# Patient Record
Sex: MALE
[De-identification: names, ages, dates, MRNs, and addresses within clinical notes are randomized per-mention and may not be internally consistent; named-entity substitution may affect disease eponyms.]

---

## 2017-01-16 ENCOUNTER — CHARTING TRANS (OUTPATIENT)
Dept: OTHER | Age: 67
End: 2017-01-16

## 2017-01-21 ENCOUNTER — MYAURORA ACCOUNT LINK (OUTPATIENT)
Dept: OTHER | Age: 67
End: 2017-01-21

## 2017-01-21 ENCOUNTER — IMAGING SERVICES (OUTPATIENT)
Dept: OTHER | Age: 67
End: 2017-01-21

## 2017-01-21 ENCOUNTER — CHARTING TRANS (OUTPATIENT)
Dept: INTERNAL MEDICINE | Age: 67
End: 2017-01-21

## 2017-01-25 ENCOUNTER — CHARTING TRANS (OUTPATIENT)
Dept: OTHER | Age: 67
End: 2017-01-25

## 2017-02-03 ENCOUNTER — CHARTING TRANS (OUTPATIENT)
Dept: SPORTS MEDICINE | Age: 67
End: 2017-02-03

## 2017-02-03 ENCOUNTER — MYAURORA ACCOUNT LINK (OUTPATIENT)
Dept: OTHER | Age: 67
End: 2017-02-03

## 2017-02-06 ENCOUNTER — CHARTING TRANS (OUTPATIENT)
Dept: OTHER | Age: 67
End: 2017-02-06

## 2017-03-17 ENCOUNTER — CHARTING TRANS (OUTPATIENT)
Dept: PODIATRY | Age: 67
End: 2017-03-17

## 2017-03-17 ENCOUNTER — MYAURORA ACCOUNT LINK (OUTPATIENT)
Dept: OTHER | Age: 67
End: 2017-03-17

## 2017-06-21 ENCOUNTER — CHARTING TRANS (OUTPATIENT)
Dept: OTHER | Age: 67
End: 2017-06-21

## 2017-06-23 ENCOUNTER — CHARTING TRANS (OUTPATIENT)
Dept: OTHER | Age: 67
End: 2017-06-23

## 2018-11-29 VITALS
DIASTOLIC BLOOD PRESSURE: 68 MMHG | HEIGHT: 71 IN | SYSTOLIC BLOOD PRESSURE: 120 MMHG | BODY MASS INDEX: 30.1 KG/M2 | WEIGHT: 215 LBS | HEART RATE: 86 BPM

## 2018-11-29 VITALS
DIASTOLIC BLOOD PRESSURE: 68 MMHG | WEIGHT: 215 LBS | BODY MASS INDEX: 30.1 KG/M2 | HEART RATE: 82 BPM | HEIGHT: 71 IN | SYSTOLIC BLOOD PRESSURE: 122 MMHG

## 2018-11-29 VITALS
HEART RATE: 85 BPM | OXYGEN SATURATION: 96 % | DIASTOLIC BLOOD PRESSURE: 62 MMHG | WEIGHT: 213 LBS | SYSTOLIC BLOOD PRESSURE: 106 MMHG

## 2019-11-20 ENCOUNTER — NEW PATIENT COMPREHENSIVE (OUTPATIENT)
Dept: URBAN - METROPOLITAN AREA CLINIC 36 | Facility: CLINIC | Age: 69
End: 2019-11-20

## 2019-11-20 DIAGNOSIS — H25.812: ICD-10-CM

## 2019-11-20 DIAGNOSIS — H25.811: ICD-10-CM

## 2019-11-20 DIAGNOSIS — H52.7: ICD-10-CM

## 2019-11-20 PROCEDURE — 92015 DETERMINE REFRACTIVE STATE: CPT

## 2019-11-20 PROCEDURE — 92004 COMPRE OPH EXAM NEW PT 1/>: CPT

## 2019-11-20 ASSESSMENT — VISUAL ACUITY
OD_CC: 20/20
OS_SC: J8
OS_CC: J1
OS_SC: 20/30+2
OS_CC: 20/20
OD_CC: J1
OD_SC: 20/30
OD_SC: J10

## 2019-11-20 ASSESSMENT — TONOMETRY
OD_IOP_MMHG: 16
OS_IOP_MMHG: 15

## 2021-01-04 ENCOUNTER — RX ONLY (OUTPATIENT)
Age: 71
Setting detail: RX ONLY
End: 2021-01-04

## 2021-08-05 ENCOUNTER — NEW PATIENT COMPREHENSIVE (OUTPATIENT)
Dept: URBAN - METROPOLITAN AREA CLINIC 36 | Facility: CLINIC | Age: 71
End: 2021-08-05

## 2021-08-05 DIAGNOSIS — H25.812: ICD-10-CM

## 2021-08-05 DIAGNOSIS — H43.813: ICD-10-CM

## 2021-08-05 DIAGNOSIS — H35.371: ICD-10-CM

## 2021-08-05 DIAGNOSIS — H17.9: ICD-10-CM

## 2021-08-05 DIAGNOSIS — H52.7: ICD-10-CM

## 2021-08-05 DIAGNOSIS — H25.811: ICD-10-CM

## 2021-08-05 DIAGNOSIS — H53.2: ICD-10-CM

## 2021-08-05 DIAGNOSIS — H35.363: ICD-10-CM

## 2021-08-05 PROCEDURE — 92004 COMPRE OPH EXAM NEW PT 1/>: CPT

## 2021-08-05 PROCEDURE — 92015 DETERMINE REFRACTIVE STATE: CPT

## 2021-08-05 ASSESSMENT — VISUAL ACUITY
OS_CC: 20/30+2
OD_SC: J12
OD_CC: 20/20
OD_SC: 20/20-1
OD_CC: J2
OS_CC: J4
OS_PH: 20/20
OS_SC: 20/40
OS_SC: J12

## 2021-08-05 ASSESSMENT — TONOMETRY
OD_IOP_MMHG: 15
OS_IOP_MMHG: 15

## 2021-08-17 ENCOUNTER — CATARACT CONSULT (OUTPATIENT)
Dept: URBAN - METROPOLITAN AREA CLINIC 43 | Facility: CLINIC | Age: 71
End: 2021-08-17

## 2021-08-17 DIAGNOSIS — H35.363: ICD-10-CM

## 2021-08-17 DIAGNOSIS — H17.9: ICD-10-CM

## 2021-08-17 DIAGNOSIS — H25.812: ICD-10-CM

## 2021-08-17 DIAGNOSIS — H25.811: ICD-10-CM

## 2021-08-17 DIAGNOSIS — H35.371: ICD-10-CM

## 2021-08-17 PROCEDURE — 99214 OFFICE O/P EST MOD 30 MIN: CPT

## 2021-08-17 PROCEDURE — 92134 CPTRZ OPH DX IMG PST SGM RTA: CPT

## 2021-08-17 PROCEDURE — 92286 ANT SGM IMG I&R SPECLR MIC: CPT

## 2021-08-17 PROCEDURE — 92025-2 CORNEAL TOPOGRAPHY, PT

## 2021-08-17 PROCEDURE — 92136TC INTERFEROMETRY - TECHNICAL COMPONENT

## 2021-08-17 RX ORDER — MOXIFLOXACIN HYDROCHLORIDE 5 MG/ML: 1 SOLUTION/ DROPS OPHTHALMIC

## 2021-08-17 RX ORDER — PREDNISOLONE ACETATE 10 MG/ML: 1 SUSPENSION/ DROPS OPHTHALMIC

## 2021-08-17 RX ORDER — KETOROLAC TROMETHAMINE 5 MG/ML: 1 SOLUTION OPHTHALMIC

## 2021-08-17 ASSESSMENT — VISUAL ACUITY
OD_CC: 20/25
OD_BAT: 20/400
OS_CC: J3
OS_SC: <J12
OS_SC: 20/60-2
OS_AM: 20/20-2
OD_SC: <J12
OD_CC: J2
OD_SC: 20/40-2
OD_RAM: 20/20-2
OS_BAT: 20/400
OS_CC: 20/30-1

## 2021-08-17 ASSESSMENT — TONOMETRY
OD_IOP_MMHG: 15
OS_IOP_MMHG: 15

## 2021-09-08 ENCOUNTER — PRE-OP/H&P (OUTPATIENT)
Dept: URBAN - METROPOLITAN AREA CLINIC 39 | Facility: CLINIC | Age: 71
End: 2021-09-08

## 2021-09-08 ENCOUNTER — SURGERY/PROCEDURE (OUTPATIENT)
Dept: URBAN - METROPOLITAN AREA CLINIC 43 | Facility: CLINIC | Age: 71
End: 2021-09-08

## 2021-09-08 DIAGNOSIS — H52.7: ICD-10-CM

## 2021-09-08 DIAGNOSIS — H25.811: ICD-10-CM

## 2021-09-08 DIAGNOSIS — H25.812: ICD-10-CM

## 2021-09-08 PROCEDURE — 66999LNSR LENSAR LASER FOR CAT SX

## 2021-09-08 PROCEDURE — 99211T TECH SERVICE

## 2021-09-08 PROCEDURE — 66984CV REMOVE CATARACT, INSERT LENS, CUSTOM VISION

## 2021-09-08 PROCEDURE — 65772LRI LRI DURING CAT SX

## 2021-09-09 ENCOUNTER — POST OP/EVAL OF SECOND EYE (OUTPATIENT)
Dept: URBAN - METROPOLITAN AREA CLINIC 36 | Facility: CLINIC | Age: 71
End: 2021-09-09

## 2021-09-09 DIAGNOSIS — H43.813: ICD-10-CM

## 2021-09-09 DIAGNOSIS — H35.363: ICD-10-CM

## 2021-09-09 DIAGNOSIS — Z83.511: ICD-10-CM

## 2021-09-09 DIAGNOSIS — H50.21: ICD-10-CM

## 2021-09-09 DIAGNOSIS — H53.2: ICD-10-CM

## 2021-09-09 DIAGNOSIS — Z96.1: ICD-10-CM

## 2021-09-09 DIAGNOSIS — H17.9: ICD-10-CM

## 2021-09-09 DIAGNOSIS — H35.371: ICD-10-CM

## 2021-09-09 DIAGNOSIS — H25.812: ICD-10-CM

## 2021-09-09 PROCEDURE — 92012 INTRM OPH EXAM EST PATIENT: CPT

## 2021-09-09 ASSESSMENT — VISUAL ACUITY
OD_SC: 20/20
OS_CC: 20/30
OS_CC: J3
OD_SC: J10

## 2021-09-09 ASSESSMENT — TONOMETRY
OD_IOP_MMHG: 18
OS_IOP_MMHG: 15

## 2021-09-15 ENCOUNTER — PRE-OP/H&P (OUTPATIENT)
Dept: URBAN - METROPOLITAN AREA CLINIC 39 | Facility: CLINIC | Age: 71
End: 2021-09-15

## 2021-09-15 ENCOUNTER — SURGERY/PROCEDURE (OUTPATIENT)
Dept: URBAN - METROPOLITAN AREA CLINIC 43 | Facility: CLINIC | Age: 71
End: 2021-09-15

## 2021-09-15 DIAGNOSIS — Z96.1: ICD-10-CM

## 2021-09-15 DIAGNOSIS — H25.812: ICD-10-CM

## 2021-09-15 PROCEDURE — 99211T TECH SERVICE

## 2021-09-15 PROCEDURE — 65772LRI LRI DURING CAT SX

## 2021-09-15 PROCEDURE — 66999LNSR LENSAR LASER FOR CAT SX

## 2021-09-15 PROCEDURE — 66984AV REMOVE CATARACT, INSERT ADVANCED LENS

## 2021-09-15 ASSESSMENT — VISUAL ACUITY
OD_SC: 20/20
OS_SC: J12
OS_SC: 20/70
OD_SC: J12

## 2021-09-15 ASSESSMENT — TONOMETRY
OS_IOP_MMHG: 14
OD_IOP_MMHG: 16

## 2021-09-16 ENCOUNTER — 1 DAY POST-OP (OUTPATIENT)
Dept: URBAN - METROPOLITAN AREA CLINIC 36 | Facility: CLINIC | Age: 71
End: 2021-09-16

## 2021-09-16 DIAGNOSIS — Z96.1: ICD-10-CM

## 2021-09-16 PROCEDURE — 99024 POSTOP FOLLOW-UP VISIT: CPT

## 2021-09-16 ASSESSMENT — VISUAL ACUITY
OS_SC: 20/40
OS_SC: J5
OD_SC: J6
OD_SC: 20/20
OS_PH: 20/25

## 2021-09-16 ASSESSMENT — TONOMETRY
OD_IOP_MMHG: 16
OS_IOP_MMHG: 17

## 2021-09-23 ENCOUNTER — POST-OP (OUTPATIENT)
Dept: URBAN - METROPOLITAN AREA CLINIC 36 | Facility: CLINIC | Age: 71
End: 2021-09-23

## 2021-09-23 DIAGNOSIS — Z96.1: ICD-10-CM

## 2021-09-23 PROCEDURE — 99024 POSTOP FOLLOW-UP VISIT: CPT

## 2021-09-23 ASSESSMENT — TONOMETRY
OS_IOP_MMHG: 17
OD_IOP_MMHG: 18

## 2021-09-23 ASSESSMENT — VISUAL ACUITY
OD_SC: 20/25+2
OD_SC: J12
OS_SC: 20/25+2
OS_SC: J3
OU_SC: J2
OU_SC: 20/20-1

## 2021-10-14 ENCOUNTER — POST-OP (OUTPATIENT)
Dept: URBAN - METROPOLITAN AREA CLINIC 36 | Facility: CLINIC | Age: 71
End: 2021-10-14

## 2021-10-14 DIAGNOSIS — Z96.1: ICD-10-CM

## 2021-10-14 PROCEDURE — 99024 POSTOP FOLLOW-UP VISIT: CPT

## 2021-10-14 ASSESSMENT — VISUAL ACUITY
OD_SC: J12
OS_SC: J2-1
OS_PH: 20/25
OD_SC: 20/25+2
OS_SC: 20/30-1

## 2021-10-14 ASSESSMENT — TONOMETRY
OS_IOP_MMHG: 18
OD_IOP_MMHG: 18

## 2022-05-10 ENCOUNTER — COMPREHENSIVE EXAM (OUTPATIENT)
Dept: URBAN - METROPOLITAN AREA CLINIC 36 | Facility: CLINIC | Age: 72
End: 2022-05-10

## 2022-05-10 DIAGNOSIS — H35.371: ICD-10-CM

## 2022-05-10 DIAGNOSIS — H52.7: ICD-10-CM

## 2022-05-10 DIAGNOSIS — H17.9: ICD-10-CM

## 2022-05-10 DIAGNOSIS — H35.363: ICD-10-CM

## 2022-05-10 DIAGNOSIS — Z96.1: ICD-10-CM

## 2022-05-10 DIAGNOSIS — H43.813: ICD-10-CM

## 2022-05-10 PROCEDURE — 92015 DETERMINE REFRACTIVE STATE: CPT

## 2022-05-10 PROCEDURE — 92134 CPTRZ OPH DX IMG PST SGM RTA: CPT

## 2022-05-10 PROCEDURE — 92014 COMPRE OPH EXAM EST PT 1/>: CPT

## 2022-05-10 ASSESSMENT — VISUAL ACUITY
OD_CC: J1
OS_CC: J1-1
OU_SC: J4
OD_SC: J12-1
OS_SC: 20/30
OU_SC: 20/20-1
OS_SC: J4
OU_CC: J1
OD_SC: 20/30
OD_PH: 20/25-1

## 2022-05-10 ASSESSMENT — TONOMETRY
OS_IOP_MMHG: 14
OD_IOP_MMHG: 15

## 2022-05-31 ENCOUNTER — FOLLOW UP (OUTPATIENT)
Dept: URBAN - METROPOLITAN AREA CLINIC 36 | Facility: CLINIC | Age: 72
End: 2022-05-31

## 2022-05-31 DIAGNOSIS — H53.2: ICD-10-CM

## 2022-05-31 DIAGNOSIS — H52.7: ICD-10-CM

## 2022-05-31 DIAGNOSIS — H50.21: ICD-10-CM

## 2022-05-31 DIAGNOSIS — H17.9: ICD-10-CM

## 2022-05-31 PROCEDURE — 92012 INTRM OPH EXAM EST PATIENT: CPT

## 2022-05-31 ASSESSMENT — VISUAL ACUITY
OS_SC: 20/20-2
OU_SC: J4-1
OD_SC: 20/20-1
OU_SC: 20/20-1

## 2022-06-08 ENCOUNTER — CONTACT LENSES/GLASSES VISIT (OUTPATIENT)
Dept: URBAN - METROPOLITAN AREA CLINIC 36 | Facility: CLINIC | Age: 72
End: 2022-06-08

## 2022-06-08 DIAGNOSIS — H52.7: ICD-10-CM

## 2022-06-08 DIAGNOSIS — Z46.0: ICD-10-CM

## 2022-06-08 PROCEDURE — 92310F

## 2022-06-08 ASSESSMENT — VISUAL ACUITY
OU_SC: 20/25-1
OS_SC: J3
OD_SC: J12
OD_SC: 20/25-1
OS_SC: 20/25-2
OU_SC: J12

## 2022-06-17 ENCOUNTER — FOLLOW UP (OUTPATIENT)
Dept: URBAN - METROPOLITAN AREA CLINIC 36 | Facility: CLINIC | Age: 72
End: 2022-06-17

## 2022-06-17 DIAGNOSIS — Z46.0: ICD-10-CM

## 2022-06-17 PROCEDURE — 92310F

## 2022-06-17 ASSESSMENT — VISUAL ACUITY
OU_CC: J2
OU_CC: 20/20-1

## 2022-07-18 ENCOUNTER — POST-OP (OUTPATIENT)
Dept: URBAN - METROPOLITAN AREA CLINIC 39 | Facility: CLINIC | Age: 72
End: 2022-07-18

## 2022-07-18 DIAGNOSIS — H17.9: ICD-10-CM

## 2022-07-18 DIAGNOSIS — Z46.0: ICD-10-CM

## 2022-07-18 DIAGNOSIS — H52.7: ICD-10-CM

## 2022-07-18 DIAGNOSIS — Z96.1: ICD-10-CM

## 2022-07-18 DIAGNOSIS — H53.2: ICD-10-CM

## 2022-07-18 DIAGNOSIS — H35.371: ICD-10-CM

## 2022-07-18 DIAGNOSIS — H50.21: ICD-10-CM

## 2022-07-18 PROCEDURE — 92134 CPTRZ OPH DX IMG PST SGM RTA: CPT

## 2022-07-18 ASSESSMENT — TONOMETRY
OD_IOP_MMHG: 13
OS_IOP_MMHG: 13

## 2022-07-18 ASSESSMENT — VISUAL ACUITY
OS: J4 @ 21"
OD_SC: J12
OS_SC: J2 @ 16"
OD_BAT: 20/20
OS_BAT: 20/30

## 2022-07-26 ENCOUNTER — FOLLOW UP (OUTPATIENT)
Dept: URBAN - METROPOLITAN AREA CLINIC 36 | Facility: CLINIC | Age: 72
End: 2022-07-26

## 2022-07-26 DIAGNOSIS — H02.883: ICD-10-CM

## 2022-07-26 DIAGNOSIS — H02.886: ICD-10-CM

## 2022-07-26 DIAGNOSIS — H04.123: ICD-10-CM

## 2022-07-26 PROCEDURE — A4263 PERMANENT TEAR DUCT PLUG: HCPCS

## 2022-07-26 PROCEDURE — 92012 INTRM OPH EXAM EST PATIENT: CPT

## 2022-07-26 PROCEDURE — 68761S PUNCTUM PLUG / SILICONE,EACH

## 2022-07-26 ASSESSMENT — VISUAL ACUITY
OD_SC: 20/20
OS_SC: 20/25
OU_SC: 20/20

## 2022-07-26 ASSESSMENT — TONOMETRY
OS_IOP_MMHG: 13
OD_IOP_MMHG: 13

## 2022-08-09 ENCOUNTER — FOLLOW UP (OUTPATIENT)
Dept: URBAN - METROPOLITAN AREA CLINIC 36 | Facility: CLINIC | Age: 72
End: 2022-08-09

## 2022-08-09 DIAGNOSIS — H02.883: ICD-10-CM

## 2022-08-09 DIAGNOSIS — H17.9: ICD-10-CM

## 2022-08-09 DIAGNOSIS — H02.886: ICD-10-CM

## 2022-08-09 DIAGNOSIS — H04.123: ICD-10-CM

## 2022-08-09 DIAGNOSIS — H53.10: ICD-10-CM

## 2022-08-09 PROCEDURE — 92012 INTRM OPH EXAM EST PATIENT: CPT

## 2022-08-09 ASSESSMENT — VISUAL ACUITY
OU_SC: 20/20-1
OS_PH: 20/25
OS_SC: 20/40
OD_SC: 20/20-2

## 2022-08-09 ASSESSMENT — TONOMETRY
OD_IOP_MMHG: 12
OS_IOP_MMHG: 12

## 2022-08-22 ENCOUNTER — CONTACT LENSES/GLASSES VISIT (OUTPATIENT)
Dept: URBAN - METROPOLITAN AREA CLINIC 46 | Facility: CLINIC | Age: 72
End: 2022-08-22

## 2022-08-22 DIAGNOSIS — Z46.0: ICD-10-CM

## 2022-08-22 PROCEDURE — 92310-3 LEVEL 3 CONTACT LENS MANAGEMENT

## 2022-08-22 ASSESSMENT — KERATOMETRY
OS_AXISANGLE2_DEGREES: 95
OD_K2POWER_DIOPTERS: 42.75
OD_K1POWER_DIOPTERS: 42.75
OD_AXISANGLE2_DEGREES: 90
OS_K1POWER_DIOPTERS: 42.00
OS_AXISANGLE_DEGREES: 5
OD_AXISANGLE_DEGREES: 180
OS_K2POWER_DIOPTERS: 42.75

## 2022-08-22 ASSESSMENT — VISUAL ACUITY
OD_SC: 20/20-2
OS_SC: 20/30

## 2022-10-28 ASSESSMENT — KERATOMETRY
OD_K2POWER_DIOPTERS: 42.75
OS_K1POWER_DIOPTERS: 42.00
OD_K1POWER_DIOPTERS: 42.75
OD_AXISANGLE_DEGREES: 180
OS_AXISANGLE2_DEGREES: 95
OD_AXISANGLE2_DEGREES: 90
OS_K2POWER_DIOPTERS: 42.75
OS_AXISANGLE_DEGREES: 5

## 2022-10-31 ENCOUNTER — FOLLOW UP (OUTPATIENT)
Dept: URBAN - METROPOLITAN AREA CLINIC 46 | Facility: CLINIC | Age: 72
End: 2022-10-31

## 2022-10-31 DIAGNOSIS — H53.2: ICD-10-CM

## 2022-10-31 DIAGNOSIS — H43.393: ICD-10-CM

## 2022-10-31 DIAGNOSIS — H53.10: ICD-10-CM

## 2022-10-31 DIAGNOSIS — H35.371: ICD-10-CM

## 2022-10-31 DIAGNOSIS — H35.363: ICD-10-CM

## 2022-10-31 DIAGNOSIS — H43.813: ICD-10-CM

## 2022-10-31 PROCEDURE — P6698455 NON-COMANAGED ADVANCED PO

## 2022-10-31 PROCEDURE — 92134 CPTRZ OPH DX IMG PST SGM RTA: CPT

## 2022-10-31 ASSESSMENT — TONOMETRY
OS_IOP_MMHG: 14
OD_IOP_MMHG: 14

## 2022-10-31 ASSESSMENT — VISUAL ACUITY
OD_SC: J10
OS_SC: J3
OD_SC: 20/20
OS_SC: 20/20-2

## 2022-12-05 ASSESSMENT — KERATOMETRY
OD_K1POWER_DIOPTERS: 42.75
OD_AXISANGLE_DEGREES: 180
OS_K2POWER_DIOPTERS: 42.75
OS_AXISANGLE2_DEGREES: 95
OD_K2POWER_DIOPTERS: 42.75
OS_K1POWER_DIOPTERS: 42.00
OD_AXISANGLE2_DEGREES: 90
OS_AXISANGLE_DEGREES: 5

## 2022-12-06 ENCOUNTER — CONSULTATION/EVALUATION (OUTPATIENT)
Dept: URBAN - METROPOLITAN AREA CLINIC 43 | Facility: CLINIC | Age: 72
End: 2022-12-06

## 2022-12-06 PROCEDURE — 92235 FLUORESCEIN ANGRPH MLTIFRAME: CPT

## 2022-12-06 PROCEDURE — 92250 FUNDUS PHOTOGRAPHY W/I&R: CPT

## 2022-12-06 PROCEDURE — 67145 PROPH RTA DTCHMNT PC: CPT

## 2022-12-06 PROCEDURE — 99214 OFFICE O/P EST MOD 30 MIN: CPT

## 2022-12-06 RX ORDER — LOTEPREDNOL ETABONATE 5 MG/ML
1 SUSPENSION/ DROPS OPHTHALMIC TWICE A DAY
Start: 2022-12-06

## 2022-12-06 ASSESSMENT — TONOMETRY
OS_IOP_MMHG: 15
OD_IOP_MMHG: 14

## 2022-12-06 ASSESSMENT — VISUAL ACUITY
OS_SC: 20/25-3
OD_SC: 20/25-2

## 2023-03-04 ENCOUNTER — RX ONLY (OUTPATIENT)
Age: 73
Setting detail: RX ONLY
End: 2023-03-04

## 2023-04-20 ENCOUNTER — COMPREHENSIVE EXAM (OUTPATIENT)
Dept: URBAN - METROPOLITAN AREA CLINIC 36 | Facility: CLINIC | Age: 73
End: 2023-04-20

## 2023-04-20 DIAGNOSIS — H43.813: ICD-10-CM

## 2023-04-20 DIAGNOSIS — H53.8: ICD-10-CM

## 2023-04-20 DIAGNOSIS — H35.363: ICD-10-CM

## 2023-04-20 DIAGNOSIS — H43.393: ICD-10-CM

## 2023-04-20 DIAGNOSIS — H04.123: ICD-10-CM

## 2023-04-20 DIAGNOSIS — H52.7: ICD-10-CM

## 2023-04-20 DIAGNOSIS — H57.13: ICD-10-CM

## 2023-04-20 DIAGNOSIS — H35.033: ICD-10-CM

## 2023-04-20 DIAGNOSIS — H33.322: ICD-10-CM

## 2023-04-20 DIAGNOSIS — Z96.1: ICD-10-CM

## 2023-04-20 DIAGNOSIS — H35.09: ICD-10-CM

## 2023-04-20 DIAGNOSIS — H35.30: ICD-10-CM

## 2023-04-20 DIAGNOSIS — H53.2: ICD-10-CM

## 2023-04-20 DIAGNOSIS — H35.373: ICD-10-CM

## 2023-04-20 PROCEDURE — 92014 COMPRE OPH EXAM EST PT 1/>: CPT

## 2023-04-20 PROCEDURE — 92015 DETERMINE REFRACTIVE STATE: CPT

## 2023-04-20 ASSESSMENT — KERATOMETRY
OD_AXISANGLE2_DEGREES: 90
OS_K1POWER_DIOPTERS: 42.00
OS_K2POWER_DIOPTERS: 42.75
OD_K2POWER_DIOPTERS: 42.75
OD_K1POWER_DIOPTERS: 42.75
OD_AXISANGLE_DEGREES: 180
OS_AXISANGLE2_DEGREES: 95
OS_AXISANGLE_DEGREES: 5

## 2023-04-20 ASSESSMENT — VISUAL ACUITY
OD_SC: 20/25
OS_SC: 20/40
OS_CC: J1
OD_CC: J1
OS_PH: 20/30

## 2023-04-20 ASSESSMENT — TONOMETRY
OD_IOP_MMHG: 08
OS_IOP_MMHG: 08

## 2023-05-01 ENCOUNTER — CONSULTATION/EVALUATION (OUTPATIENT)
Dept: URBAN - METROPOLITAN AREA CLINIC 39 | Facility: CLINIC | Age: 73
End: 2023-05-01

## 2023-05-01 DIAGNOSIS — H35.09: ICD-10-CM

## 2023-05-01 DIAGNOSIS — H52.7: ICD-10-CM

## 2023-05-01 DIAGNOSIS — H57.13: ICD-10-CM

## 2023-05-01 DIAGNOSIS — H43.813: ICD-10-CM

## 2023-05-01 DIAGNOSIS — Z96.1: ICD-10-CM

## 2023-05-01 DIAGNOSIS — H43.392: ICD-10-CM

## 2023-05-01 DIAGNOSIS — H43.391: ICD-10-CM

## 2023-05-01 DIAGNOSIS — H26.493: ICD-10-CM

## 2023-05-01 DIAGNOSIS — H53.8: ICD-10-CM

## 2023-05-01 DIAGNOSIS — H35.363: ICD-10-CM

## 2023-05-01 PROCEDURE — 99214 OFFICE O/P EST MOD 30 MIN: CPT

## 2023-05-01 PROCEDURE — 92134 CPTRZ OPH DX IMG PST SGM RTA: CPT

## 2023-05-01 PROCEDURE — 92025 CPTRIZED CORNEAL TOPOGRAPHY: CPT

## 2023-05-01 RX ORDER — CYCLOSPORINE 0 MG/ML: 1 SOLUTION/ DROPS OPHTHALMIC; TOPICAL TWICE A DAY

## 2023-05-01 ASSESSMENT — VISUAL ACUITY
OD_BAT: 20/20
OU_SC: J2 @ 16"
OS_BAT: 20/30
OS_SC: J2 @ 16"
OD_SC: J12 @ 16"

## 2023-05-01 ASSESSMENT — KERATOMETRY
OS_AXISANGLE2_DEGREES: 95
OS_K1POWER_DIOPTERS: 42.00
OS_AXISANGLE_DEGREES: 5
OD_K1POWER_DIOPTERS: 42.75
OD_AXISANGLE2_DEGREES: 90
OS_K2POWER_DIOPTERS: 42.75
OD_K2POWER_DIOPTERS: 42.75
OD_AXISANGLE_DEGREES: 180

## 2023-05-01 ASSESSMENT — TONOMETRY
OS_IOP_MMHG: 11
OD_IOP_MMHG: 12

## 2023-06-13 ENCOUNTER — PREPPED CHART (OUTPATIENT)
Dept: URBAN - METROPOLITAN AREA CLINIC 39 | Facility: CLINIC | Age: 73
End: 2023-06-13

## 2023-06-13 DIAGNOSIS — H35.09: ICD-10-CM

## 2023-06-13 DIAGNOSIS — H43.391: ICD-10-CM

## 2023-06-13 DIAGNOSIS — H57.13: ICD-10-CM

## 2023-06-13 DIAGNOSIS — Z96.1: ICD-10-CM

## 2023-06-13 DIAGNOSIS — H26.493: ICD-10-CM

## 2023-06-13 DIAGNOSIS — H53.8: ICD-10-CM

## 2023-06-13 DIAGNOSIS — H43.392: ICD-10-CM

## 2023-06-13 DIAGNOSIS — H43.813: ICD-10-CM

## 2023-06-13 DIAGNOSIS — H04.123: ICD-10-CM

## 2023-06-13 DIAGNOSIS — H26.491: ICD-10-CM

## 2023-06-13 DIAGNOSIS — H52.7: ICD-10-CM

## 2023-06-13 DIAGNOSIS — H35.363: ICD-10-CM

## 2023-06-13 PROCEDURE — 99214 OFFICE O/P EST MOD 30 MIN: CPT

## 2023-06-13 PROCEDURE — 92025 CPTRIZED CORNEAL TOPOGRAPHY: CPT

## 2023-06-13 ASSESSMENT — VISUAL ACUITY
OD_SC: J12
OS_SC: 20/15-2
OD_SC: 20/15
OS_SC: J2

## 2023-06-13 ASSESSMENT — TONOMETRY
OS_IOP_MMHG: 12
OD_IOP_MMHG: 12

## 2023-07-04 ENCOUNTER — RX ONLY (OUTPATIENT)
Age: 73
Setting detail: RX ONLY
End: 2023-07-04

## 2024-02-28 ENCOUNTER — RX ONLY (OUTPATIENT)
Age: 74
Setting detail: RX ONLY
End: 2024-02-28

## 2024-03-04 ENCOUNTER — APPOINTMENT (RX ONLY)
Dept: URBAN - METROPOLITAN AREA CLINIC 132 | Facility: CLINIC | Age: 74
Setting detail: DERMATOLOGY
End: 2024-03-04

## 2024-03-04 DIAGNOSIS — L81.4 OTHER MELANIN HYPERPIGMENTATION: ICD-10-CM

## 2024-03-04 DIAGNOSIS — L57.8 OTHER SKIN CHANGES DUE TO CHRONIC EXPOSURE TO NONIONIZING RADIATION: ICD-10-CM

## 2024-03-04 DIAGNOSIS — D49.2 NEOPLASM OF UNSPECIFIED BEHAVIOR OF BONE, SOFT TISSUE, AND SKIN: ICD-10-CM

## 2024-03-04 DIAGNOSIS — L85.3 XEROSIS CUTIS: ICD-10-CM

## 2024-03-04 DIAGNOSIS — L82.1 OTHER SEBORRHEIC KERATOSIS: ICD-10-CM

## 2024-03-04 DIAGNOSIS — D22 MELANOCYTIC NEVI: ICD-10-CM

## 2024-03-04 DIAGNOSIS — D18.0 HEMANGIOMA: ICD-10-CM

## 2024-03-04 DIAGNOSIS — L57.0 ACTINIC KERATOSIS: ICD-10-CM

## 2024-03-04 PROBLEM — D22.9 MELANOCYTIC NEVI, UNSPECIFIED: Status: ACTIVE | Noted: 2024-03-04

## 2024-03-04 PROBLEM — D18.01 HEMANGIOMA OF SKIN AND SUBCUTANEOUS TISSUE: Status: ACTIVE | Noted: 2024-03-04

## 2024-03-04 PROCEDURE — 11311 SHAVE SKIN LESION 0.6-1.0 CM: CPT

## 2024-03-04 PROCEDURE — ? SUNSCREEN RECOMMENDATIONS

## 2024-03-04 PROCEDURE — ? COUNSELING

## 2024-03-04 PROCEDURE — ? SHAVE REMOVAL

## 2024-03-04 PROCEDURE — 17003 DESTRUCT PREMALG LES 2-14: CPT

## 2024-03-04 PROCEDURE — 17000 DESTRUCT PREMALG LESION: CPT | Mod: 59

## 2024-03-04 PROCEDURE — ? LIQUID NITROGEN

## 2024-03-04 PROCEDURE — 99203 OFFICE O/P NEW LOW 30 MIN: CPT | Mod: 25

## 2024-03-04 ASSESSMENT — LOCATION ZONE DERM
LOCATION ZONE: FACE
LOCATION ZONE: SCALP

## 2024-03-04 ASSESSMENT — LOCATION SIMPLE DESCRIPTION DERM
LOCATION SIMPLE: LEFT CHEEK
LOCATION SIMPLE: LEFT OCCIPITAL SCALP

## 2024-03-04 ASSESSMENT — LOCATION DETAILED DESCRIPTION DERM
LOCATION DETAILED: LEFT CENTRAL MANDIBULAR CHEEK
LOCATION DETAILED: LEFT SUPERIOR OCCIPITAL SCALP

## 2024-03-04 NOTE — PROCEDURE: SHAVE REMOVAL
Medical Necessity Information: It is in your best interest to select a reason for this procedure from the list below. All of these items fulfill various CMS LCD requirements except the new and changing color options.
Medical Necessity Clause: This procedure was medically necessary because the lesion that was treated was:
Lab: [id number]
Lab Facility: [id number]
Detail Level: Detailed
Was A Bandage Applied: Yes
Size Of Lesion In Cm (Required): 0.6
X Size Of Lesion In Cm (Optional): 0
Depth Of Shave: dermis
Biopsy Method: Dermablade
Anesthesia Type: 1% lidocaine with epinephrine
Hemostasis: Drysol
Wound Care: Petrolatum
Render Path Notes In Note?: No
Consent was obtained from the patient. The risks and benefits to therapy were discussed in detail. Specifically, the risks of infection, scarring, bleeding, prolonged wound healing, incomplete removal, allergy to anesthesia, nerve injury and recurrence were addressed. Prior to the procedure, the treatment site was clearly identified and confirmed by the patient. All components of Universal Protocol/PAUSE Rule completed.
Post-Care Instructions: I reviewed with the patient in detail post-care instructions. Patient is to keep the biopsy site dry overnight, and then apply bacitracin twice daily until healed. Patient may apply hydrogen peroxide soaks to remove any crusting.
Notification Instructions: Patient will be notified of pathology results. However, patient instructed to call the office if not contacted within 2 weeks.
Billing Type: Third-Party Bill

## 2024-03-04 NOTE — PROCEDURE: COUNSELING
Detail Level: Generalized
Cleanser Recommendations: Dove unscented, Cetaphil, CeraVe
Moisturizer Recommendations: Cetaphil, CeraVe, Eucerin, Aveeno

## 2025-03-20 ENCOUNTER — APPOINTMENT (OUTPATIENT)
Dept: RURAL CLINIC 4 | Facility: CLINIC | Age: 75
Setting detail: DERMATOLOGY
End: 2025-03-20

## 2025-03-20 DIAGNOSIS — D18.0 HEMANGIOMA: ICD-10-CM

## 2025-03-20 DIAGNOSIS — D22 MELANOCYTIC NEVI: ICD-10-CM

## 2025-03-20 DIAGNOSIS — L82.1 OTHER SEBORRHEIC KERATOSIS: ICD-10-CM

## 2025-03-20 DIAGNOSIS — D49.2 NEOPLASM OF UNSPECIFIED BEHAVIOR OF BONE, SOFT TISSUE, AND SKIN: ICD-10-CM

## 2025-03-20 DIAGNOSIS — L81.4 OTHER MELANIN HYPERPIGMENTATION: ICD-10-CM

## 2025-03-20 DIAGNOSIS — L57.8 OTHER SKIN CHANGES DUE TO CHRONIC EXPOSURE TO NONIONIZING RADIATION: ICD-10-CM

## 2025-03-20 DIAGNOSIS — L85.3 XEROSIS CUTIS: ICD-10-CM

## 2025-03-20 DIAGNOSIS — L57.0 ACTINIC KERATOSIS: ICD-10-CM

## 2025-03-20 DIAGNOSIS — Z87.2 PERSONAL HISTORY OF DISEASES OF THE SKIN AND SUBCUTANEOUS TISSUE: ICD-10-CM

## 2025-03-20 PROBLEM — D22.9 MELANOCYTIC NEVI, UNSPECIFIED: Status: ACTIVE | Noted: 2025-03-20

## 2025-03-20 PROBLEM — D18.01 HEMANGIOMA OF SKIN AND SUBCUTANEOUS TISSUE: Status: ACTIVE | Noted: 2025-03-20

## 2025-03-20 PROCEDURE — ? SHAVE REMOVAL

## 2025-03-20 PROCEDURE — ? COUNSELING

## 2025-03-20 PROCEDURE — 11301 SHAVE SKIN LESION 0.6-1.0 CM: CPT

## 2025-03-20 PROCEDURE — 99213 OFFICE O/P EST LOW 20 MIN: CPT | Mod: 25

## 2025-03-20 PROCEDURE — ? PHOTO-DOCUMENTATION

## 2025-03-20 PROCEDURE — ? LIQUID NITROGEN

## 2025-03-20 PROCEDURE — ? SUNSCREEN RECOMMENDATIONS

## 2025-03-20 PROCEDURE — 17000 DESTRUCT PREMALG LESION: CPT | Mod: 59

## 2025-03-20 ASSESSMENT — LOCATION DETAILED DESCRIPTION DERM
LOCATION DETAILED: RIGHT SUPERIOR PARIETAL SCALP
LOCATION DETAILED: NASAL DORSUM
LOCATION DETAILED: PERIUMBILICAL SKIN

## 2025-03-20 ASSESSMENT — LOCATION ZONE DERM
LOCATION ZONE: SCALP
LOCATION ZONE: NOSE
LOCATION ZONE: TRUNK

## 2025-03-20 ASSESSMENT — LOCATION SIMPLE DESCRIPTION DERM
LOCATION SIMPLE: ABDOMEN
LOCATION SIMPLE: SCALP
LOCATION SIMPLE: NOSE

## 2025-03-20 NOTE — PROCEDURE: SHAVE REMOVAL
Medical Necessity Information: It is in your best interest to select a reason for this procedure from the list below. All of these items fulfill various CMS LCD requirements except the new and changing color options.
Medical Necessity Clause: This procedure was medically necessary because the lesion that was treated was:
Lab: [id number]
Lab Facility: [id number]
Detail Level: Detailed
Was A Bandage Applied: Yes
Size Of Lesion In Cm (Required): 0.7
X Size Of Lesion In Cm (Optional): 0
Depth Of Shave: dermis
Biopsy Method: Dermablade
Anesthesia Type: 1% lidocaine with epinephrine and a 1:10 solution of 8.4% sodium bicarbonate
Anesthesia Volume In Cc: 1
Hemostasis: Drysol
Wound Care: Mupirocin
Render Path Notes In Note?: No
Consent was obtained from the patient. The risks and benefits to therapy were discussed in detail. Specifically, the risks of infection, scarring, bleeding, prolonged wound healing, incomplete removal, allergy to anesthesia, nerve injury and recurrence were addressed. Prior to the procedure, the treatment site was clearly identified and confirmed by the patient. All components of Universal Protocol/PAUSE Rule completed.
Post-Care Instructions: I reviewed with the patient in detail post-care instructions. Patient is to keep the biopsy site dry overnight, and then apply bacitracin twice daily until healed. Patient may apply hydrogen peroxide soaks to remove any crusting.
Notification Instructions: Patient will be notified of pathology results. However, patient instructed to call the office if not contacted within 2 weeks.
Billing Type: Third-Party Bill

## 2025-03-20 NOTE — PROCEDURE: LIQUID NITROGEN
Number Of Freeze-Thaw Cycles: 2 freeze-thaw cycles
Render Post-Care Instructions In Note?: no
Detail Level: Detailed
Consent: The patient's consent was obtained including but not limited to risks of crusting, scabbing, blistering, scarring, darker or lighter pigmentary change, recurrence, incomplete removal and infection.
Show Aperture Variable?: Yes
Application Tool (Optional): Liquid Nitrogen Sprayer
Duration Of Freeze Thaw-Cycle (Seconds): 0
Post-Care Instructions: I reviewed with the patient in detail post-care instructions. Patient is to wear sunprotection, and avoid picking at any of the treated lesions. Pt may apply Vaseline to crusted or scabbing areas.

## 2025-04-24 ENCOUNTER — APPOINTMENT (OUTPATIENT)
Dept: RURAL CLINIC 4 | Facility: CLINIC | Age: 75
Setting detail: DERMATOLOGY
End: 2025-04-24

## 2025-04-24 DIAGNOSIS — D22 MELANOCYTIC NEVI: ICD-10-CM

## 2025-04-24 PROBLEM — D22.5 MELANOCYTIC NEVI OF TRUNK: Status: ACTIVE | Noted: 2025-04-24

## 2025-04-24 PROCEDURE — ? PATHOLOGY DISCUSSION

## 2025-04-24 PROCEDURE — ? SHAVE REMOVAL

## 2025-04-24 PROCEDURE — ? COUNSELING

## 2025-04-24 PROCEDURE — ? PHOTO-DOCUMENTATION

## 2025-04-24 PROCEDURE — 11301 SHAVE SKIN LESION 0.6-1.0 CM: CPT

## 2025-04-24 ASSESSMENT — LOCATION SIMPLE DESCRIPTION DERM: LOCATION SIMPLE: ABDOMEN

## 2025-04-24 ASSESSMENT — LOCATION ZONE DERM: LOCATION ZONE: TRUNK

## 2025-04-24 ASSESSMENT — LOCATION DETAILED DESCRIPTION DERM: LOCATION DETAILED: PERIUMBILICAL SKIN

## 2025-04-24 NOTE — PROCEDURE: SHAVE REMOVAL
Medical Necessity Information: It is in your best interest to select a reason for this procedure from the list below. All of these items fulfill various CMS LCD requirements except the new and changing color options.
Medical Necessity Clause: This procedure was medically necessary because the lesion that was treated was:
Lab: [id number]
Lab Facility: [id number]
Detail Level: Detailed
Was A Bandage Applied: Yes
Size Of Lesion In Cm (Required): 1
X Size Of Lesion In Cm (Optional): 0
Depth Of Shave: dermis
Biopsy Method: Dermablade
Anesthesia Type: 1% lidocaine with epinephrine and a 1:10 solution of 8.4% sodium bicarbonate
Hemostasis: Drysol
Wound Care: Mupirocin
Render Path Notes In Note?: No
Consent was obtained from the patient. The risks and benefits to therapy were discussed in detail. Specifically, the risks of infection, scarring, bleeding, prolonged wound healing, incomplete removal, allergy to anesthesia, nerve injury and recurrence were addressed. Prior to the procedure, the treatment site was clearly identified and confirmed by the patient. All components of Universal Protocol/PAUSE Rule completed.
Post-Care Instructions: I reviewed with the patient in detail post-care instructions. Patient is to keep the biopsy site dry overnight, and then apply bacitracin twice daily until healed. Patient may apply hydrogen peroxide soaks to remove any crusting.
Notification Instructions: Patient will be notified of pathology results. However, patient instructed to call the office if not contacted within 2 weeks.
Billing Type: Third-Party Bill